# Patient Record
Sex: FEMALE | URBAN - METROPOLITAN AREA
[De-identification: names, ages, dates, MRNs, and addresses within clinical notes are randomized per-mention and may not be internally consistent; named-entity substitution may affect disease eponyms.]

---

## 2021-07-30 ENCOUNTER — OFFICE VISIT (OUTPATIENT)
Dept: URBAN - METROPOLITAN AREA CLINIC 30 | Facility: CLINIC | Age: 61
End: 2021-07-30
Payer: MEDICARE

## 2021-07-30 DIAGNOSIS — H04.123 TEAR FILM INSUFFICIENCY OF BILATERAL LACRIMAL GLANDS: ICD-10-CM

## 2021-07-30 DIAGNOSIS — G35 MULTIPLE SCLEROSIS: ICD-10-CM

## 2021-07-30 DIAGNOSIS — H25.13 AGE-RELATED NUCLEAR CATARACT, BILATERAL: ICD-10-CM

## 2021-07-30 DIAGNOSIS — H46.9 UNSPECIFIED OPTIC NEURITIS: ICD-10-CM

## 2021-07-30 DIAGNOSIS — H43.393 OTHER VITREOUS OPACITIES, BILATERAL: Primary | ICD-10-CM

## 2021-07-30 PROCEDURE — 92004 COMPRE OPH EXAM NEW PT 1/>: CPT | Performed by: OPTOMETRIST

## 2021-07-30 PROCEDURE — 92134 CPTRZ OPH DX IMG PST SGM RTA: CPT | Performed by: OPTOMETRIST

## 2021-07-30 ASSESSMENT — KERATOMETRY
OD: 43.13
OS: 43.47

## 2021-07-30 ASSESSMENT — VISUAL ACUITY
OD: CF
OS: 20/50

## 2021-07-30 ASSESSMENT — INTRAOCULAR PRESSURE
OS: 14
OD: 13

## 2021-07-30 NOTE — IMPRESSION/PLAN
Impression: Unspecified optic neuritis: H46.9. Plan: MS X 25 years. Disc pallor noted OD. Pt is poor historian. reports poor vision for years. No disc edema noted currently. Appears inactive. Optic atrophy limits BCVA. Poor prognosis. consider Jesus tablet for reading. Discussed low vision specialist. RNFL and Disc photos today.

## 2021-07-30 NOTE — IMPRESSION/PLAN
Impression: Tear film insufficiency of bilateral lacrimal glands: H04.123. Plan: Pt notes dryness and pain OU. ATs TID OU. blinking exercises.

## 2021-07-30 NOTE — IMPRESSION/PLAN
Impression: Other vitreous opacities, bilateral: H43.393. Plan: No evidence of RD or tear noted. All signs and risks of retinal detachment or tears were discussed in detail. If pt. notices any symptoms discussed, contact office ASAP. Recommend pt. return for normal recall. see today's Mac OCT for findings.

## 2021-07-30 NOTE — IMPRESSION/PLAN
Impression: Age-related nuclear cataract, bilateral: H25.13. Plan: Cataracts account for some decreased vision, however, the patient is aware with optic nerve changes that level of vision post operatively cannot be determined. Discussed risks, benefits, procedures and recovery. Defers sx.